# Patient Record
Sex: FEMALE | Race: WHITE
[De-identification: names, ages, dates, MRNs, and addresses within clinical notes are randomized per-mention and may not be internally consistent; named-entity substitution may affect disease eponyms.]

---

## 2020-09-16 ENCOUNTER — HOSPITAL ENCOUNTER (EMERGENCY)
Dept: HOSPITAL 74 - FER | Age: 55
Discharge: HOME | End: 2020-09-16
Payer: COMMERCIAL

## 2020-09-16 VITALS — SYSTOLIC BLOOD PRESSURE: 99 MMHG | HEART RATE: 74 BPM | DIASTOLIC BLOOD PRESSURE: 56 MMHG | TEMPERATURE: 100.3 F

## 2020-09-16 VITALS — BODY MASS INDEX: 20.7 KG/M2

## 2020-09-16 DIAGNOSIS — N12: Primary | ICD-10-CM

## 2020-09-16 LAB
ALBUMIN SERPL-MCNC: 4.1 G/DL (ref 3.4–5)
ALP SERPL-CCNC: 41 U/L (ref 45–117)
ALT SERPL-CCNC: 14 U/L (ref 13–61)
ANION GAP SERPL CALC-SCNC: 8 MMOL/L (ref 8–16)
AST SERPL-CCNC: 20 U/L (ref 15–37)
BILIRUB SERPL-MCNC: 0.7 MG/DL (ref 0.2–1)
BUN SERPL-MCNC: 10 MG/DL (ref 7–18)
CALCIUM SERPL-MCNC: 8.6 MG/DL (ref 8.5–10)
CHLORIDE SERPL-SCNC: 104 MMOL/L (ref 98–107)
CO2 SERPL-SCNC: 23 MMOL/L (ref 21–32)
CREAT SERPL-MCNC: 0.8 MG/DL (ref 0.55–1.3)
DEPRECATED RDW RBC AUTO: 13 % (ref 11.6–15.6)
EPITH CASTS URNS QL MICRO: (no result) /HPF
GLUCOSE SERPL-MCNC: 109 MG/DL (ref 74–106)
HCT VFR BLD CALC: 41.1 % (ref 32.4–45.2)
HGB BLD-MCNC: 13.9 GM/DL (ref 10.7–15.3)
MCH RBC QN AUTO: 31 PG (ref 25.7–33.7)
MCHC RBC AUTO-ENTMCNC: 33.8 G/DL (ref 32–36)
MCV RBC: 91.7 FL (ref 80–96)
PLATELET # BLD AUTO: 185 K/MM3 (ref 134–434)
PLATELET BLD QL SMEAR: ADEQUATE
PMV BLD: 8 FL (ref 7.5–11.1)
POTASSIUM SERPLBLD-SCNC: 3.4 MMOL/L (ref 3.5–5.1)
PROT SERPL-MCNC: 6.6 G/DL (ref 6.4–8.2)
RBC # BLD AUTO: 4.48 M/MM3 (ref 3.6–5.2)
SODIUM SERPL-SCNC: 135 MMOL/L (ref 136–145)
WBC # BLD AUTO: 13 K/MM3 (ref 4–10.8)

## 2020-09-16 PROCEDURE — 3E033GC INTRODUCTION OF OTHER THERAPEUTIC SUBSTANCE INTO PERIPHERAL VEIN, PERCUTANEOUS APPROACH: ICD-10-PCS

## 2020-09-16 PROCEDURE — U0003 INFECTIOUS AGENT DETECTION BY NUCLEIC ACID (DNA OR RNA); SEVERE ACUTE RESPIRATORY SYNDROME CORONAVIRUS 2 (SARS-COV-2) (CORONAVIRUS DISEASE [COVID-19]), AMPLIFIED PROBE TECHNIQUE, MAKING USE OF HIGH THROUGHPUT TECHNOLOGIES AS DESCRIBED BY CMS-2020-01-R: HCPCS

## 2020-09-16 PROCEDURE — 3E033NZ INTRODUCTION OF ANALGESICS, HYPNOTICS, SEDATIVES INTO PERIPHERAL VEIN, PERCUTANEOUS APPROACH: ICD-10-PCS

## 2020-09-16 NOTE — PDOC
Attending Attestation





- Resident


Resident Name: Omega Gongora





- ED Attending Attestation


I have performed the following: I have examined & evaluated the patient, The 

case was reviewed & discussed with the resident, I agree w/resident's findings &

plan, Exceptions are as noted





- HPI


HPI: 





09/16/20 18:03


54 yo F with h/o migraines, here with flank pain. and fevers.   states has had 

symptoms for 2 days, c/o mild headache. does have h/o migraines, mild nausea, no

vomiting. pt states she already had covic in march, no rash. no dysuria, or 

urgency. has had left sided flank pain which she has had with p kylah in the 

past. initially was bilaterally, now left sided. fever 103 at home, took tylenol

at 11 am. 


09/16/20 19:20








- Physicial Exam


PE: 





09/16/20 19:21


awake alert lungs clear bilat heart rrr no mrg abd soft left mid abd ttp. left 

flank cva tenderness. ext wwp. no edema. no calf tenderness. skin warm and dry. 





- Medical Decision Making





09/16/20 19:22


54 yo F h/o migraines, here with fever left flank pain. differential pyelo, uti,

diverticulitis less likely, 


plan labs ua culture and lactate. given toradol, reglan, and ivf.  


ua positive for uti, mild wbc elevation. will treat with ceftriaxone. pt offered

admission for pyelo would like to go home.


givein rx for keflex 500 tid. 





Discharge





- Discharge Information


Problems reviewed: Yes


Clinical Impression/Diagnosis: 


 Pyelonephritis





Condition: Stable


Disposition: HOME





- Additional Discharge Information


Prescriptions: 


Cephalexin [Keflex] 500 mg PO QID 14 Days #56 capsule





- Follow up/Referral


Referrals: 


Romel German MD [Staff Physician] - 


Lynette Hoffman MD [Staff Physician] - 





- Patient Discharge Instructions


Patient Printed Discharge Instructions:  DI for Kidney Infection


Additional Instructions: 


Please see your Primary Doctor within the next 48 hours. Take the antibiotic 

Keflex as prescribed for the next 14 days until completion. Return to the ER for

new or concerning symptoms including but not limited to: inability to eat or 

drink, worsening pain, persistent fevers. 





Thank you





- Post Discharge Activity